# Patient Record
Sex: MALE | Race: WHITE | NOT HISPANIC OR LATINO | URBAN - METROPOLITAN AREA
[De-identification: names, ages, dates, MRNs, and addresses within clinical notes are randomized per-mention and may not be internally consistent; named-entity substitution may affect disease eponyms.]

---

## 2019-12-15 ENCOUNTER — EMERGENCY (EMERGENCY)
Facility: HOSPITAL | Age: 24
LOS: 1 days | Discharge: HOME | End: 2019-12-15
Attending: EMERGENCY MEDICINE
Payer: COMMERCIAL

## 2019-12-15 VITALS
SYSTOLIC BLOOD PRESSURE: 110 MMHG | TEMPERATURE: 98 F | OXYGEN SATURATION: 96 % | RESPIRATION RATE: 16 BRPM | HEART RATE: 64 BPM

## 2019-12-15 VITALS
WEIGHT: 154.1 LBS | HEART RATE: 76 BPM | RESPIRATION RATE: 18 BRPM | HEIGHT: 67 IN | OXYGEN SATURATION: 100 % | SYSTOLIC BLOOD PRESSURE: 142 MMHG | TEMPERATURE: 98 F | DIASTOLIC BLOOD PRESSURE: 91 MMHG

## 2019-12-15 PROCEDURE — 12001 RPR S/N/AX/GEN/TRNK 2.5CM/<: CPT | Mod: XU

## 2019-12-15 PROCEDURE — 99283 EMERGENCY DEPT VISIT LOW MDM: CPT | Mod: 25

## 2019-12-15 PROCEDURE — 12001 RPR S/N/AX/GEN/TRNK 2.5CM/<: CPT | Mod: 59

## 2019-12-15 PROCEDURE — 73130 X-RAY EXAM OF HAND: CPT

## 2019-12-15 PROCEDURE — 29125 APPL SHORT ARM SPLINT STATIC: CPT

## 2019-12-15 PROCEDURE — 29125 APPL SHORT ARM SPLINT STATIC: CPT | Mod: RT

## 2019-12-15 PROCEDURE — 73130 X-RAY EXAM OF HAND: CPT | Mod: 26,RT

## 2019-12-15 NOTE — ED PROVIDER NOTE - ATTENDING CONTRIBUTION TO CARE
------------ATTENDING NOTE------------   healthy vaccinated RHD pt w/ parents c/o mild dull pain and laceration to R hand (over 5th MCP) from punching glass tonight, has FROM, 5/5, nvi w/ bcr distally (2 pt discrimination), no FB, sutured/splinted, nml VS at dc, no additional complaints, pt/family feel safe at home, in depth dw all about ddx, tx, albarran, continued close outpt fu.  - Errol Potter MD  ---------------------------------------------- ------------ATTENDING NOTE------------   healthy vaccinated RHD pt w/ parents c/o mild dull pain and laceration to R hand (over 5th MCP) from punching glass tonight, has FROM, 5/5, nvi w/ bcr distally (2 pt discrimination), no FB on extensive exam (pt/family still understand risk of potential retained FB), sutured/splinted, nml VS at [place], no additional complaints, pt/family feel safe at home, in depth dw all about ddx, tx, albarran, continued close outpt fu.  - Errol Potter MD  ----------------------------------------------

## 2019-12-15 NOTE — ED PROVIDER NOTE - NSFOLLOWUPINSTRUCTIONS_ED_ALL_ED_FT
1. You were seen in the Emergency Room for lacerations  2. You may take ACETAMINOPHEN and IBUPROFEN as directed on packaging. Always follow medication instructions and read medication side effects. Stop using these medications if you have any concerns   3. Please follow up with your doctor in 7-10 days to have sutures removed  4. Return to the emergency room or seek immediate assistance for any new or concerning symptoms (such as fevers, chills, numbness/tingling in your fingers, color changes or worsening pain in your fingers, redness, swelling or discharge from your wounds ), or if you get worse.   5. Copies of your tests were provided to you for follow-up.  You must address all your findings with your doctor.     -Pain should decrease after 24-48 hours. Please see your physician immediately if pain increases or recurs because this may be a sign of infection  -If possible, keep wound elevated  -Most wounds can be gently cleansed 12-24 hours after wound repair. You can bath after 12-24h as long as the wound is not immersed or soaked in water. Application of an antibiotic ointment or reapplication of a dressing is recommended after each washing.  -You should watch for signs of wound infection such as excessive discomfort, continuous drainage (especially purulent), wound redness and swelling, induration or tenderness, lymph node enlargement and fever. Please return to the Emergency room or seek immediate assistance should these symptoms occur.

## 2019-12-15 NOTE — ED PROVIDER NOTE - CARE PLAN
Principal Discharge DX:	Laceration of right hand without foreign body, initial encounter  Secondary Diagnosis:	Contusion of right hand, initial encounter

## 2019-12-15 NOTE — ED PROVIDER NOTE - PROGRESS NOTE DETAILS
Doris Avila MD PGY-2 lacs repaired and patient placed in volar splint to restrict flexion of mcps and strain on sutures. see note. d/c'd home with pmd follow up and return precautions

## 2019-12-15 NOTE — ED PROCEDURE NOTE - NS ED PROCEDURE ASSISTED BY
Supervision was available
Assistance was available
Supervision was available
Supervision was available

## 2019-12-15 NOTE — ED PROCEDURE NOTE - CPROC ED POST PROC CARE GUIDE1
Verbal/written post procedure instructions were given to patient/caregiver./Instructed patient/caregiver regarding signs and symptoms of infection./Instructed patient/caregiver to follow-up with primary care physician./Keep the cast/splint/dressing clean and dry.
Instructed patient/caregiver regarding signs and symptoms of infection./Keep the cast/splint/dressing clean and dry./Verbal/written post procedure instructions were given to patient/caregiver./Instructed patient/caregiver to follow-up with primary care physician.
Instructed patient/caregiver to follow-up with primary care physician./Keep the cast/splint/dressing clean and dry./Verbal/written post procedure instructions were given to patient/caregiver./Instructed patient/caregiver regarding signs and symptoms of infection.

## 2019-12-15 NOTE — ED PROVIDER NOTE - OBJECTIVE STATEMENT
Doris Avila MD PGY-2 23 yo M p/w R hand lacerations after punching glass. Tetanus up to date. Doris Avila MD PGY-2 23 yo M no pmh p/w R hand lacerations after punching glass. Tetanus up to date.

## 2019-12-15 NOTE — ED ADULT NURSE NOTE - OBJECTIVE STATEMENT
23y/o M coming to the ED with 3 lacerations on the top of his hand. Pt states that 30minutes ago he punched glass causing lacerations on the top of his hand. Pt c/o of 3/10 pain and does not want anything for pain. Pt has positive peripheral pulses. Pt c/o of numbness and tingling but has positive sensation intact. Pt having slight active bleeding from lacerations. 23y/o M coming to the ED with 3 lacerations on the top of his hand by the carpal bones. Pt states that 30minutes ago he punched glass causing lacerations on the top of his hand. Pt c/o of 3/10 pain and does not want anything for pain. Pt has positive peripheral pulses. Pt c/o of numbness and tingling but has positive sensation intact. Pt having minimal bleeding from lacerations.

## 2019-12-15 NOTE — ED PROVIDER NOTE - PATIENT PORTAL LINK FT
You can access the FollowMyHealth Patient Portal offered by [facility] by registering at the following website: [email or web address]. By joining [employer]’s FollowMyHealth portal, you will also be able to view your health information using other applications (apps) compatible with our system.

## 2019-12-15 NOTE — ED PROCEDURE NOTE - ATTENDING CONTRIBUTION TO CARE
as above -- Errol Potter MD
BX dressing to wounds, short volar splint for R hand, nvi w/ bcr distally post application -- Errol Potter MD
as above  - Errol Potter MD
as above, un complicated  -- Errol Potter MD

## 2019-12-15 NOTE — ED PROCEDURE NOTE - CPROC ED TIME OUT STATEMENT1
“Patient's name, , procedure and correct site were confirmed during the [place] Timeout.”
“Patient's name, , procedure and correct site were confirmed during the [place] Timeout.”
“Patient's name, , procedure and correct site were confirmed during the [place] Timeout.”
“Patient's name, , procedure and correct site were confirmed during the [place] Timeout.”

## 2019-12-15 NOTE — ED PROVIDER NOTE - PHYSICAL EXAMINATION
PHYSICAL EXAM:   General: well-appearing, appears stated age, in no acute distress  HEENT: NC/AT,  airway patent  Cardiovascular: regular rate   Respiratory: , nonlabored respirations  Extremities: R hand- skin avulsion at 5th MCP and additional laceration over 3rd MCP. No tenderness to palpation along 5th metacarpal. full  strength. Sensation difficult to assess because patient has already received ulnar block at the time of my exam. +R radial pulse  Skin: as above  -Doris Avila PGY-2

## 2020-02-10 PROBLEM — Z78.9 OTHER SPECIFIED HEALTH STATUS: Chronic | Status: ACTIVE | Noted: 2019-12-17

## 2020-02-20 ENCOUNTER — APPOINTMENT (OUTPATIENT)
Dept: ORTHOPEDIC SURGERY | Facility: CLINIC | Age: 25
End: 2020-02-20
Payer: COMMERCIAL

## 2020-02-20 VITALS
DIASTOLIC BLOOD PRESSURE: 70 MMHG | SYSTOLIC BLOOD PRESSURE: 142 MMHG | HEART RATE: 68 BPM | BODY MASS INDEX: 24.17 KG/M2 | WEIGHT: 154 LBS | HEIGHT: 67 IN

## 2020-02-20 DIAGNOSIS — Z78.9 OTHER SPECIFIED HEALTH STATUS: ICD-10-CM

## 2020-02-20 DIAGNOSIS — S61.411D LACERATION W/OUT FOREIGN BODY OF RIGHT HAND, SUBSEQUENT ENCOUNTER: ICD-10-CM

## 2020-02-20 PROCEDURE — 99204 OFFICE O/P NEW MOD 45 MIN: CPT

## 2020-02-20 NOTE — PHYSICAL EXAM
[de-identified] : Previous imaging reviewed showing possible cortical violation of 5th metacarpal  [de-identified] : Constitutional: Alert and in no acute distress.\par Psychiatric: Oriented to person, place, and time. Insight and judgement were intact and the affect was normal.\par Cardiovascular: Regular rate assessed through peripheral pulses.\par Pulmonary: Nonlabored breathing on room air.\par Lymphatics: No peripheral lymphadenopathy appreciated.\par \par Musculoskeletal:\par \par Cervical spine mobility is full in all directions. \par \par No skin changes are noted to the upper extremities. Muscle bulk and contour are within normal limits without evidence of atrophy. Healed laceration over dorsal metacarpal phalangeal joints. Soft tissue prominence over 4th extensor tendon on dorsum of hand.\par \par Mobility is full about the elbows with flexion and extension. Forearm supination measures 85/85 degrees. Forearm pronation measures 85/85 degrees. Wrist flexion measured 75/75 degrees. Wrist extension measures 65/65 degrees. Digital flexion and extension is full. No extensor tendon weakness on right. Thumb opposition is full to the base of the small fingers bilaterally. Thumb abduction measures 5/5 in strength. Interosseous abduction measures 5/5 in strength. No cords or nodules are palpated. No triggering is observed. No tenderness over the thumb CMC articulations is observed. Scaphoid shift test is negative. Finkelstein test is negative. Scapholunate and lunotriquetral ballottement test are negative. Ulnar snuffbox tenderness is negative. Ulnar impingement test is negative. DRUJ piano key test is negative.\par \par Sensation is intact to light touch in the ulnar, median, and radial nerve distributions. Carpal tunnel provocative maneuvers are negative. Cubital tunnel provocative maneuvers are negative. Fingers are pink and well perfused. Capillary refill is brisk.\par

## 2020-02-20 NOTE — ASSESSMENT
[FreeTextEntry1] : 24 year old male presents with right dorsal hand laceration with resulting stiffness and no extensor weakness. Treatment options were discussed, including operative and nonoperative treatment. At this time, I have recommended and the patient has opted for nonoperative treatment. Recommend therapy to work on scar massage. Return appointment will be scheduled for four weeks.

## 2020-02-20 NOTE — HISTORY OF PRESENT ILLNESS
[FreeTextEntry1] : Trung is a 24y male who presents with right hand pain x3 months. In 12/2019, he cut the dorsum of his hand on glass. He went to [facility] where he had x-rays taken and had his hand sutured. Since then, he has had a bump on the dorsum of his hand with pain. Pain is 6/10, worse when making a fist, and  radiates to the tips of his middle and ring fingers. He has not done anything to treat it. The bump has increased in size since he first noticed it in December. He also has occasional numbness in the 2-5th fingers, but is reporting no numbness today.

## 2020-03-20 ENCOUNTER — APPOINTMENT (OUTPATIENT)
Dept: ORTHOPEDIC SURGERY | Facility: CLINIC | Age: 25
End: 2020-03-20

## 2023-05-19 ENCOUNTER — APPOINTMENT (OUTPATIENT)
Dept: ORTHOPEDIC SURGERY | Facility: CLINIC | Age: 28
End: 2023-05-19
Payer: COMMERCIAL

## 2023-05-19 VITALS
HEART RATE: 66 BPM | BODY MASS INDEX: 24.33 KG/M2 | HEIGHT: 67 IN | TEMPERATURE: 98 F | WEIGHT: 155 LBS | OXYGEN SATURATION: 99 %

## 2023-05-19 DIAGNOSIS — M25.531 PAIN IN RIGHT WRIST: ICD-10-CM

## 2023-05-19 PROCEDURE — 99203 OFFICE O/P NEW LOW 30 MIN: CPT

## 2023-05-19 PROCEDURE — 73110 X-RAY EXAM OF WRIST: CPT | Mod: RT

## 2023-05-19 RX ORDER — DICLOFENAC SODIUM 75 MG/1
75 TABLET, DELAYED RELEASE ORAL
Qty: 1 | Refills: 0 | Status: ACTIVE | COMMUNITY
Start: 2023-05-19 | End: 1900-01-01

## 2023-05-19 NOTE — DISCUSSION/SUMMARY
[de-identified] : The patient has right wrist ulnar-sided pain.  This is likely a sprain.  He may have a TFCC injury.  I have discussed the pathology, natural history and treatment options with him.  He is started on a course of diclofenac.  Medication risks have been reviewed.  He will be reevaluated in 3 weeks.

## 2023-05-19 NOTE — HISTORY OF PRESENT ILLNESS
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 He reports he injured the wrist throwing a jab on a boxing bag. He feels intermittent sharp pain worse with rotating the wrist and ulnar deviation. He also has some pain with flicking movements of the wrist while lifting weights. He has tried Tylenol and icing with some relief. He has some reported weakness in the right hand. Denies paresthesias. Denies prior injury.

## 2023-05-19 NOTE — PHYSICAL EXAM
[Rad] : radial 2+ and symmetric bilaterally [Normal] : Alert and in no acute distress [Poor Appearance] : well-appearing [Acute Distress] : not in acute distress [Obese] : not obese [de-identified] : The patient has no respiratory distress. Mood and affect are normal. The patient is alert and oriented to person, place and time.\par Examination of the cervical spine demonstrates no tenderness, no deformity and no muscle spasm. Cervical spine rotation is 60° to the right, 60° to the left, 75° of extension and 45° of flexion. Neurologic exam of the upper extremities reveals intact sensation to light touch. Motor function is 5 over 5 in all groups. Deep tendon reflexes are 2+ and equal at the biceps, triceps and brachioradialis.\par Examination of the right shoulder demonstrates no swelling, no deformity and no tenderness. The shoulder is stable. Drop arm test is negative. Kossuth test is negative. Liftoff test is negative. Motor strength is 5 over 5 in all groups. Range of motion is full and identical to that of the left shoulder. Flexion is 160°, abduction 160°, external rotation 45° and internal rotation to the lower thoracic level.  There is no pain with motion of the elbows.  There is no tenderness of either elbow.  Examination of the right wrist demonstrates ulnar-sided tenderness.  There is no radial tenderness.  There is ulnar pain with wrist motion and with forearm rotation.  Tendon function is intact.  There is no instability.  Capillary refill is brisk. [de-identified] : AP, lateral and oblique x-rays of the right wrist taken today demonstrate no fracture, no dislocation and no bony abnormality.

## 2023-09-14 ENCOUNTER — APPOINTMENT (OUTPATIENT)
Dept: ORTHOPEDIC SURGERY | Facility: CLINIC | Age: 28
End: 2023-09-14
Payer: COMMERCIAL

## 2023-09-14 VITALS
BODY MASS INDEX: 24.64 KG/M2 | SYSTOLIC BLOOD PRESSURE: 113 MMHG | HEIGHT: 67 IN | TEMPERATURE: 97.4 F | DIASTOLIC BLOOD PRESSURE: 76 MMHG | OXYGEN SATURATION: 98 % | WEIGHT: 157 LBS

## 2023-09-14 DIAGNOSIS — M25.562 PAIN IN LEFT KNEE: ICD-10-CM

## 2023-09-14 PROCEDURE — 99214 OFFICE O/P EST MOD 30 MIN: CPT

## 2023-09-14 PROCEDURE — 73564 X-RAY EXAM KNEE 4 OR MORE: CPT | Mod: LT

## 2023-09-20 ENCOUNTER — OUTPATIENT (OUTPATIENT)
Dept: OUTPATIENT SERVICES | Facility: HOSPITAL | Age: 28
LOS: 1 days | End: 2023-09-20
Payer: COMMERCIAL

## 2023-09-20 ENCOUNTER — APPOINTMENT (OUTPATIENT)
Dept: MRI IMAGING | Facility: CLINIC | Age: 28
End: 2023-09-20
Payer: COMMERCIAL

## 2023-09-20 DIAGNOSIS — S89.92XA UNSPECIFIED INJURY OF LEFT LOWER LEG, INITIAL ENCOUNTER: ICD-10-CM

## 2023-09-20 PROCEDURE — 73721 MRI JNT OF LWR EXTRE W/O DYE: CPT

## 2023-09-20 PROCEDURE — 73721 MRI JNT OF LWR EXTRE W/O DYE: CPT | Mod: 26,LT

## 2023-09-22 ENCOUNTER — APPOINTMENT (OUTPATIENT)
Dept: ORTHOPEDIC SURGERY | Facility: CLINIC | Age: 28
End: 2023-09-22
Payer: COMMERCIAL

## 2023-09-22 VITALS
SYSTOLIC BLOOD PRESSURE: 114 MMHG | TEMPERATURE: 97.8 F | DIASTOLIC BLOOD PRESSURE: 71 MMHG | WEIGHT: 157 LBS | HEART RATE: 59 BPM | BODY MASS INDEX: 24.64 KG/M2 | HEIGHT: 67 IN

## 2023-09-22 DIAGNOSIS — S89.92XA UNSPECIFIED INJURY OF LEFT LOWER LEG, INITIAL ENCOUNTER: ICD-10-CM

## 2023-09-22 PROCEDURE — 99214 OFFICE O/P EST MOD 30 MIN: CPT

## 2023-10-02 ENCOUNTER — APPOINTMENT (OUTPATIENT)
Dept: ORTHOPEDIC SURGERY | Facility: CLINIC | Age: 28
End: 2023-10-02
Payer: COMMERCIAL

## 2023-10-02 VITALS
HEIGHT: 67 IN | DIASTOLIC BLOOD PRESSURE: 81 MMHG | SYSTOLIC BLOOD PRESSURE: 119 MMHG | WEIGHT: 155 LBS | BODY MASS INDEX: 24.33 KG/M2 | HEART RATE: 60 BPM

## 2023-10-02 PROCEDURE — 99214 OFFICE O/P EST MOD 30 MIN: CPT

## 2023-11-06 ENCOUNTER — APPOINTMENT (OUTPATIENT)
Dept: ORTHOPEDIC SURGERY | Facility: CLINIC | Age: 28
End: 2023-11-06
Payer: COMMERCIAL

## 2023-11-06 PROCEDURE — 99213 OFFICE O/P EST LOW 20 MIN: CPT

## 2023-12-18 ENCOUNTER — APPOINTMENT (OUTPATIENT)
Dept: ORTHOPEDIC SURGERY | Facility: CLINIC | Age: 28
End: 2023-12-18
Payer: COMMERCIAL

## 2023-12-18 VITALS
SYSTOLIC BLOOD PRESSURE: 134 MMHG | BODY MASS INDEX: 24.8 KG/M2 | WEIGHT: 158 LBS | DIASTOLIC BLOOD PRESSURE: 77 MMHG | HEIGHT: 67 IN

## 2023-12-18 DIAGNOSIS — S83.522D SPRAIN OF POSTERIOR CRUCIATE LIGAMENT OF LEFT KNEE, SUBSEQUENT ENCOUNTER: ICD-10-CM

## 2023-12-18 PROCEDURE — 99213 OFFICE O/P EST LOW 20 MIN: CPT

## 2023-12-19 PROBLEM — S83.522D RUPTURE OF POSTERIOR CRUCIATE LIGAMENT OF LEFT KNEE, SUBSEQUENT ENCOUNTER: Status: ACTIVE | Noted: 2023-10-02

## 2023-12-19 NOTE — PHYSICAL EXAM
[de-identified] : Left Knee Exam:   Skin: Clean, dry, intact Inspection: No obvious malalignment, no masses, no swelling, mild effusion Pulses: 2+ DP/PT pulses ROM: 0-135 degrees of flexion. Min pain with deep knee flexion/extension. Tenderness: No MJLT. No LJLT. No pain over the patella facets. No pain to the quadriceps tendon. No pain to the patella tendon. No posterior knee tenderness. Stability: Stable to varus, valgus. Negative Lachman testing. Negative anterior drawer, firm end point with posterior drawer. Increased AP instability c/w contralateral side. Strength: 5/5 Q/H/TA/GS/EHL, without atrophy Neuro: Intact to light touch throughout, DTRs normal Additional Tests: Negative Anjum's test, Negative patellar grind test Other findings: None. [de-identified] : Images were reviewed dated 9/14/2023.  4 views of the left knee that show no acute fracture or dislocation. There is no medial, no lateral and no patellofemoral degenerative changes seen. There is no significant malalignment. No significant other obvious osseous abnormality, otherwise unremarkable.   MRI left knee dated 09/14/2023 shows full-thickness midsubstance rupture of the posterior cruciate ligament. Large joint effusion.  We independently reviewed and discussed in detail the images and the radiologic reports with the patient.

## 2023-12-19 NOTE — HISTORY OF PRESENT ILLNESS
[de-identified] : 28 year old male presents for f/u of left knee injury that occurred 9/13/ 23. He was playing basketball, jumped and landed, hitting his knee into the wall. He has been attending PT 2x per week. Reports improvement of strength in the knee. Takes Advil as needed.

## 2023-12-19 NOTE — DISCUSSION/SUMMARY
[de-identified] : 27 y/o male with left PCL tear.   Patient presents for follow-up of PCL tear.  Patient continues to have a firm endpoint on clinical examination, but with increased AP instability compared to the contralateral side.  Patient continues to wear his brace, and has been progressing with physical therapy and home exercise program.  Patient's symptoms are improving but has been feeling some increased sharp discomforts inside the knee joint. We discussed continued conservative care.  Recommendations: Continue PT, Rx given; increase impact activity to tolerance. Functional PCL bracing.  Ice/NSAIDs as needed  Follow-up 2-3 months.

## 2023-12-19 NOTE — ADDENDUM
[FreeTextEntry1] : This note was written by Alejandra Valdovinos on 12/18/2023 acting solely as a scribe for Dr. Ricardo Cooper.  All medical record entries made by the Scribe were at my, Dr. Ricardo Cooper, direction and personally dictated by me on 12/18/2023. I have personally reviewed the chart and agree that the record accurately reflects my personal performance of the history, physical exam, assessment and plan.

## 2024-02-12 ENCOUNTER — APPOINTMENT (OUTPATIENT)
Dept: ORTHOPEDIC SURGERY | Facility: CLINIC | Age: 29
End: 2024-02-12

## 2024-09-09 ENCOUNTER — EMERGENCY (EMERGENCY)
Facility: HOSPITAL | Age: 29
LOS: 1 days | Discharge: HOME | End: 2024-09-09
Attending: EMERGENCY MEDICINE
Payer: COMMERCIAL

## 2024-09-09 VITALS
HEART RATE: 70 BPM | RESPIRATION RATE: 18 BRPM | HEIGHT: 68 IN | OXYGEN SATURATION: 100 % | DIASTOLIC BLOOD PRESSURE: 68 MMHG | WEIGHT: 160.06 LBS | TEMPERATURE: 98 F | SYSTOLIC BLOOD PRESSURE: 116 MMHG

## 2024-09-09 VITALS
HEART RATE: 63 BPM | DIASTOLIC BLOOD PRESSURE: 59 MMHG | TEMPERATURE: 98 F | RESPIRATION RATE: 16 BRPM | OXYGEN SATURATION: 100 % | SYSTOLIC BLOOD PRESSURE: 111 MMHG

## 2024-09-09 LAB
ACANTHOCYTES BLD QL SMEAR: SLIGHT — SIGNIFICANT CHANGE UP
ALBUMIN SERPL ELPH-MCNC: 4.6 G/DL — SIGNIFICANT CHANGE UP (ref 3.3–5)
ALP SERPL-CCNC: 68 U/L — SIGNIFICANT CHANGE UP (ref 40–120)
ALT FLD-CCNC: 27 U/L — SIGNIFICANT CHANGE UP (ref 10–45)
ANION GAP SERPL CALC-SCNC: 12 MMOL/L — SIGNIFICANT CHANGE UP (ref 5–17)
ANISOCYTOSIS BLD QL: SLIGHT — SIGNIFICANT CHANGE UP
APPEARANCE UR: CLEAR — SIGNIFICANT CHANGE UP
AST SERPL-CCNC: 21 U/L — SIGNIFICANT CHANGE UP (ref 10–40)
BASOPHILS # BLD AUTO: 0 K/UL — SIGNIFICANT CHANGE UP (ref 0–0.2)
BASOPHILS NFR BLD AUTO: 0 % — SIGNIFICANT CHANGE UP (ref 0–2)
BILIRUB SERPL-MCNC: 1.1 MG/DL — SIGNIFICANT CHANGE UP (ref 0.2–1.2)
BILIRUB UR-MCNC: NEGATIVE — SIGNIFICANT CHANGE UP
BUN SERPL-MCNC: 16 MG/DL — SIGNIFICANT CHANGE UP (ref 7–23)
CALCIUM SERPL-MCNC: 9.5 MG/DL — SIGNIFICANT CHANGE UP (ref 8.4–10.5)
CHLORIDE SERPL-SCNC: 104 MMOL/L — SIGNIFICANT CHANGE UP (ref 96–108)
CO2 SERPL-SCNC: 24 MMOL/L — SIGNIFICANT CHANGE UP (ref 22–31)
COLOR SPEC: YELLOW — SIGNIFICANT CHANGE UP
CREAT SERPL-MCNC: 0.98 MG/DL — SIGNIFICANT CHANGE UP (ref 0.5–1.3)
DACRYOCYTES BLD QL SMEAR: SLIGHT — SIGNIFICANT CHANGE UP
DIFF PNL FLD: NEGATIVE — SIGNIFICANT CHANGE UP
EGFR: 107 ML/MIN/1.73M2 — SIGNIFICANT CHANGE UP
ELLIPTOCYTES BLD QL SMEAR: SLIGHT — SIGNIFICANT CHANGE UP
EOSINOPHIL # BLD AUTO: 0.11 K/UL — SIGNIFICANT CHANGE UP (ref 0–0.5)
EOSINOPHIL NFR BLD AUTO: 1.8 % — SIGNIFICANT CHANGE UP (ref 0–6)
GLUCOSE SERPL-MCNC: 87 MG/DL — SIGNIFICANT CHANGE UP (ref 70–99)
GLUCOSE UR QL: NEGATIVE MG/DL — SIGNIFICANT CHANGE UP
HCT VFR BLD CALC: 36 % — LOW (ref 39–50)
HGB BLD-MCNC: 11.2 G/DL — LOW (ref 13–17)
KETONES UR-MCNC: NEGATIVE MG/DL — SIGNIFICANT CHANGE UP
LEUKOCYTE ESTERASE UR-ACNC: NEGATIVE — SIGNIFICANT CHANGE UP
LIDOCAIN IGE QN: 26 U/L — SIGNIFICANT CHANGE UP (ref 7–60)
LYMPHOCYTES # BLD AUTO: 1.39 K/UL — SIGNIFICANT CHANGE UP (ref 1–3.3)
LYMPHOCYTES # BLD AUTO: 22.1 % — SIGNIFICANT CHANGE UP (ref 13–44)
MACROCYTES BLD QL: SLIGHT — SIGNIFICANT CHANGE UP
MANUAL SMEAR VERIFICATION: SIGNIFICANT CHANGE UP
MCHC RBC-ENTMCNC: 20.2 PG — LOW (ref 27–34)
MCHC RBC-ENTMCNC: 31.1 GM/DL — LOW (ref 32–36)
MCV RBC AUTO: 65 FL — LOW (ref 80–100)
MICROCYTES BLD QL: SLIGHT — SIGNIFICANT CHANGE UP
MONOCYTES # BLD AUTO: 0.67 K/UL — SIGNIFICANT CHANGE UP (ref 0–0.9)
MONOCYTES NFR BLD AUTO: 10.6 % — SIGNIFICANT CHANGE UP (ref 2–14)
NEUTROPHILS # BLD AUTO: 4.13 K/UL — SIGNIFICANT CHANGE UP (ref 1.8–7.4)
NEUTROPHILS NFR BLD AUTO: 65.5 % — SIGNIFICANT CHANGE UP (ref 43–77)
NITRITE UR-MCNC: NEGATIVE — SIGNIFICANT CHANGE UP
OVALOCYTES BLD QL SMEAR: SLIGHT — SIGNIFICANT CHANGE UP
PH UR: 6.5 — SIGNIFICANT CHANGE UP (ref 5–8)
PLAT MORPH BLD: ABNORMAL
PLATELET # BLD AUTO: 164 K/UL — SIGNIFICANT CHANGE UP (ref 150–400)
POIKILOCYTOSIS BLD QL AUTO: SIGNIFICANT CHANGE UP
POLYCHROMASIA BLD QL SMEAR: SLIGHT — SIGNIFICANT CHANGE UP
POTASSIUM SERPL-MCNC: 4.1 MMOL/L — SIGNIFICANT CHANGE UP (ref 3.5–5.3)
POTASSIUM SERPL-SCNC: 4.1 MMOL/L — SIGNIFICANT CHANGE UP (ref 3.5–5.3)
PROT SERPL-MCNC: 7.5 G/DL — SIGNIFICANT CHANGE UP (ref 6–8.3)
PROT UR-MCNC: NEGATIVE MG/DL — SIGNIFICANT CHANGE UP
RBC # BLD: 5.54 M/UL — SIGNIFICANT CHANGE UP (ref 4.2–5.8)
RBC # FLD: 16 % — HIGH (ref 10.3–14.5)
RBC BLD AUTO: ABNORMAL
SODIUM SERPL-SCNC: 140 MMOL/L — SIGNIFICANT CHANGE UP (ref 135–145)
SP GR SPEC: >1.03 — HIGH (ref 1–1.03)
UROBILINOGEN FLD QL: 1 MG/DL — SIGNIFICANT CHANGE UP (ref 0.2–1)
WBC # BLD: 6.3 K/UL — SIGNIFICANT CHANGE UP (ref 3.8–10.5)
WBC # FLD AUTO: 6.3 K/UL — SIGNIFICANT CHANGE UP (ref 3.8–10.5)

## 2024-09-09 PROCEDURE — 83690 ASSAY OF LIPASE: CPT

## 2024-09-09 PROCEDURE — 74177 CT ABD & PELVIS W/CONTRAST: CPT | Mod: MC

## 2024-09-09 PROCEDURE — 80053 COMPREHEN METABOLIC PANEL: CPT

## 2024-09-09 PROCEDURE — 99284 EMERGENCY DEPT VISIT MOD MDM: CPT | Mod: 25

## 2024-09-09 PROCEDURE — 81003 URINALYSIS AUTO W/O SCOPE: CPT

## 2024-09-09 PROCEDURE — 96374 THER/PROPH/DIAG INJ IV PUSH: CPT | Mod: XU

## 2024-09-09 PROCEDURE — 96375 TX/PRO/DX INJ NEW DRUG ADDON: CPT

## 2024-09-09 PROCEDURE — 99285 EMERGENCY DEPT VISIT HI MDM: CPT

## 2024-09-09 PROCEDURE — 74177 CT ABD & PELVIS W/CONTRAST: CPT | Mod: 26,MC

## 2024-09-09 PROCEDURE — 85025 COMPLETE CBC W/AUTO DIFF WBC: CPT

## 2024-09-09 RX ORDER — ACETAMINOPHEN 325 MG/1
1000 TABLET ORAL ONCE
Refills: 0 | Status: COMPLETED | OUTPATIENT
Start: 2024-09-09 | End: 2024-09-09

## 2024-09-09 RX ORDER — ONDANSETRON 2 MG/ML
4 INJECTION, SOLUTION INTRAMUSCULAR; INTRAVENOUS ONCE
Refills: 0 | Status: COMPLETED | OUTPATIENT
Start: 2024-09-09 | End: 2024-09-09

## 2024-09-09 RX ADMIN — Medication 1000 MILLILITER(S): at 16:01

## 2024-09-09 RX ADMIN — ACETAMINOPHEN 400 MILLIGRAM(S): 325 TABLET ORAL at 16:01

## 2024-09-09 RX ADMIN — ONDANSETRON 4 MILLIGRAM(S): 2 INJECTION, SOLUTION INTRAMUSCULAR; INTRAVENOUS at 16:01

## 2024-09-09 NOTE — ED PROVIDER NOTE - PATIENT PORTAL LINK FT
You can access the FollowMyHealth Patient Portal offered by [facility] by registering at the following website: [email or web address]. By joining [employer]’s FollowMyHealth portal, you will also be able to view your health information using other applications (apps) compatible with our system.

## 2024-09-09 NOTE — ED ADULT NURSE NOTE - NSFALLUNIVINTERV_ED_ALL_ED
Bed/Stretcher in lowest position, wheels locked, appropriate side rails in place/Call bell, personal items and telephone in reach/Instruct patient to call for assistance before getting out of bed/chair/stretcher/Non-slip footwear applied when patient is off stretcher/[place] to call system/Physically safe environment - no spills, clutter or unnecessary equipment/Purposeful proactive rounding/Room/bathroom lighting operational, light cord in reach

## 2024-09-09 NOTE — ED PROVIDER NOTE - PROGRESS NOTE DETAILS
Jersey Preston DO (PGY-2)Received signout on patient from day team.  29-year-old male past medical history hypothyroidism presenting complaining of abdominal pain since last night.  Now localized to the right lower quadrant.  Positive nausea, no vomiting.  Normal bowel movements.  CT unable to identify appendix recommending serial abdominal exams.  Spoke with surgery resident at bedside stating give patient fluids and p.o. challenge.  Patient successfully p.o. challenged here and is declining fluids requesting to go home. Patient reevaluated at bedside. Patient is doing well, abd sntnd no rebound no guarding non rigidity.  I spoke with the patient extensively about return he endorsed understanding via teach back method.  He will follow-up with a GI doctor as scheduled.

## 2024-09-09 NOTE — ED PROVIDER NOTE - PHYSICAL EXAMINATION
Physical Exam:  General: NAD, Conversive  Eyes: EOMI, Conjunctiva and sclera clear  Neck: No JVD  Lungs: Clear to auscultation bilaterally, no wheeze, no rhonchi  Heart: Normal S1, S2, no murmurs  Abdomen: Soft, + tender to RLQ, nondistended, no CVA tenderness. Negative Rovsing's and Psoas sign  Extremities: 2+ peripheral pulses, no edema  Psych: AAO X3  Neurologic: Non-focal

## 2024-09-09 NOTE — ED PROVIDER NOTE - NSFOLLOWUPINSTRUCTIONS_ED_ALL_ED_FT
You were seen in the emergency department for abdominal pain. We have evaluated you and determined that you do not require further hospital interventions.    During your stay you had the following relevant results:   You had a CAT scan which was not able to well visualize the appendix.  Your blood work does not have signs of infection.  Your urine does not show signs of infection.  You received fluids and you are able to tolerate oral foods and you note that your pain has improved significantly.  It is extremely important that you follow-up with a GI doctor.  Should your symptoms worsen–worsening abdominal pain, nausea, vomiting, fever, chills please return to the emergency department.  Please follow up with your primary care provider as necessary to discuss the results of your stay in our department.

## 2024-09-09 NOTE — ED PROVIDER NOTE - OBJECTIVE STATEMENT
This is a 29-year-old male with history of hypothyroidism presenting for abdominal pain.  He reports last night he developed onset of generalized abdominal pain and then this morning it has localized to the right lower quadrant.  Associated with nausea but no vomiting.  Last bowel movement was yesterday and normal.  He did have some chills in the waiting room but otherwise no fevers.  No dysuria, testicular pain.  No history of surgeries.  Did not take any medication for symptoms.

## 2024-09-09 NOTE — ED ADULT NURSE NOTE - OBJECTIVE STATEMENT
29 y.argelia RODRIGUEZ [facility] IBS, 29 y.o M PMH IBS, Hashimotos, on synthroid presenting to the ED for RLQ abd pain. Pt reports that he first noticed the pain last night across his lower abdomen but the pain persisted into the AM and is now more localized to the RLQ. Pt reports nausea with decreased PO intake since last night. Denies vomiting, diarrhea, constipation. Last BM was yesterday and was normal as per patient. Pt denies fevers/chills, dizziness, weakness, urinary symptoms, headache, visual changes. Patient safety maintained, bed is in lowest position, wheels locked, and side rails raised. Patient oriented to call bell, and call bell is within reach.

## 2024-09-09 NOTE — ED PROVIDER NOTE - CLINICAL SUMMARY MEDICAL DECISION MAKING FREE TEXT BOX
This is a 29-year-old male with history as above presenting for abdominal pain.  Vitals unremarkable, exam as above.  Highest concern for appendicitis given description of symptoms, right lower quadrant tenderness.  Other differentials possible include nephrolithiasis, pyelonephritis, colitis, diverticulitis, cholecystitis, but these are all less likely.  Will obtain labs, CT, UA and treat symptomatically.

## 2024-09-09 NOTE — ED PROVIDER NOTE - ATTENDING CONTRIBUTION TO CARE
29M hx of IBS on Bentyl, Thalassemia, hypothyroid here with c/o abd pain onset last night 8PM, constant since then, assoc w nausea, no emesis, no bm today, no fever, no urinary sx, no testicular pain. Last night pain was crampy and diffuse across lower abdomen but today localized to RLQ, worse w breathing, coughing, bumps in car. VS non actionable. PE well appearing non toxic, stoic but slightly uncomfortable, RRR, lungs CTA Bl, soft RLQ TTP w referred pain. R/o appendicitis, Less likely renal colic, no urinary sx. Colitis is a possibility given hx of. Has had colonoscopy in past and r/o for IBD. Will get labs, CTAP, pain control. Dispo pending results.

## 2024-09-10 NOTE — CONSULT NOTE ADULT - SUBJECTIVE AND OBJECTIVE BOX
HPI: 29M PMH hypothyroidism, IBS, p/w nausea RLQ pain since 8pm last night. Denies vomiting. Denies fevers, chills, diarrhea, constipation. Last BM yesterday, passing gas today. States that this episode started by feeling like his previous IBS flares, but lasted longer than usual. Pt denies migratory symptoms. Endorses a colonoscopy 1-2 years ago with no abnormal findings.     PAST MEDICAL & SURGICAL HISTORY:  No pertinent past medical history    MEDICATIONS  (STANDING):  lactated ringers Bolus 1000 milliLiter(s) IV Bolus once    MEDICATIONS  (PRN):    Allergies    cephalosporins (Other)  amoxicillin (Other)    Intolerances    SOCIAL HISTORY:    FAMILY HISTORY:    Physical Exam:  General: NAD, resting comfortably  HEENT: NC/AT  Pulmonary: normal resp effort, saturating well on RA.   Cardiovascular: VSS, NSR   Abdominal: soft, non-distended, RLQ mild tenderness.   Extremities: WWP, normal strength  Neuro: A/O x 3, CNs II-XII grossly intact, normal sensation, no focal deficits  Pulses: palpable distal pulses    Vital Signs Last 24 Hrs  T(C): 36.8 (09 Sep 2024 21:37), Max: 36.9 (09 Sep 2024 12:59)  T(F): 98.2 (09 Sep 2024 21:37), Max: 98.5 (09 Sep 2024 12:59)  HR: 63 (09 Sep 2024 21:37) (61 - 76)  BP: 111/59 (09 Sep 2024 21:37) (108/53 - 116/76)  BP(mean): 85 (09 Sep 2024 16:08) (85 - 85)  RR: 16 (09 Sep 2024 21:37) (15 - 18)  SpO2: 100% (09 Sep 2024 21:37) (99% - 100%)    Parameters below as of 09 Sep 2024 21:37  Patient On (Oxygen Delivery Method): room air    LABS:                        11.2   6.30  )-----------( 164      ( 09 Sep 2024 16:12 )             36.0     09-09    140  |  104  |  16  ----------------------------<  87  4.1   |  24  |  0.98    Ca    9.5      09 Sep 2024 16:12    TPro  7.5  /  Alb  4.6  /  TBili  1.1  /  DBili  x   /  AST  21  /  ALT  27  /  AlkPhos  68  09-09      Urinalysis Basic - ( 09 Sep 2024 19:51 )    Color: Yellow / Appearance: Clear / SG: >1.030 / pH: x  Gluc: x / Ketone: Negative mg/dL  / Bili: Negative / Urobili: 1.0 mg/dL   Blood: x / Protein: Negative mg/dL / Nitrite: Negative   Leuk Esterase: Negative / RBC: x / WBC x   Sq Epi: x / Non Sq Epi: x / Bacteria: x      CAPILLARY BLOOD GLUCOSE    LIVER FUNCTIONS - ( 09 Sep 2024 16:12 )  Alb: 4.6 g/dL / Pro: 7.5 g/dL / ALK PHOS: 68 U/L / ALT: 27 U/L / AST: 21 U/L / GGT: x             INTERPRETATION:  CLINICAL INFORMATION: Right lower quadrant pain    COMPARISON: None.    CONTRAST/COMPLICATIONS:  IV Contrast: Omnipaque 350  90 cc administered   10 cc discarded  Oral Contrast: NONE  Complications: None reported at time of study completion    PROCEDURE:  CT of the Abdomen and Pelvis was performed.  Sagittal and coronal reformats were performed.    FINDINGS:  LOWERCHEST: Within normal limits.    LIVER: Within normal limits.  BILE DUCTS: Normal caliber.  GALLBLADDER: Within normal limits.  SPLEEN: Within normal limits.  PANCREAS: Within normal limits.  ADRENALS: Within normal limits.  KIDNEYS/URETERS: Within normal limits.    BLADDER: Within normal limits.  REPRODUCTIVE ORGANS: Prostate calcifications.    BOWEL: No bowel obstruction. Appendix is not visualized. No evidence of   inflammation in the pericecal region.  PERITONEUM/RETROPERITONEUM: Within normallimits.  VESSELS: Within normal limits.  LYMPH NODES: No lymphadenopathy. A prominent subcentimeter short axis   nodes in the right lower quadrant mesentery.  ABDOMINAL WALL: Within normal limits.  BONES: Within normal limits.    IMPRESSION:    Appendix is not identified. No secondary signs of appendicitis are seen.   Short-term follow-up examinations can be obtained if there is high   clinical suspicion for appendicitis.    Splenomegaly.    Few prominent right lower quadrant mesenteric lymph nodes without size   criteria for lymphadenopathy.      --- End of Report ---

## 2024-09-10 NOTE — CONSULT NOTE ADULT - ASSESSMENT
29M PMH hypothyroidism, IBS, p/w nausea RLQ pain that he describes as similar to previous IBS episodes, but longer in duration. CT scan without evidence of appendicitis, but appendix not identified. RLQ mesenteric lymph node enlargement. WBC 6.3.     Recommendations:   - No acute surgical intervention  - Pt felt a bit better than on entering ED.   - Pt trialed PO challenge and was resuscitated with IV fluids and symptoms resolved.  - ED sent patient home with return precautions    Patient discussed with Dr. Silva  [phone]

## 2024-09-16 ENCOUNTER — APPOINTMENT (OUTPATIENT)
Dept: ORTHOPEDIC SURGERY | Facility: CLINIC | Age: 29
End: 2024-09-16
Payer: COMMERCIAL

## 2024-09-16 VITALS — HEIGHT: 68 IN | BODY MASS INDEX: 24.25 KG/M2 | WEIGHT: 160 LBS

## 2024-09-16 DIAGNOSIS — S83.522D SPRAIN OF POSTERIOR CRUCIATE LIGAMENT OF LEFT KNEE, SUBSEQUENT ENCOUNTER: ICD-10-CM

## 2024-09-16 PROCEDURE — 99213 OFFICE O/P EST LOW 20 MIN: CPT

## 2024-09-16 NOTE — ADDENDUM
[FreeTextEntry1] : This note was written by Dalila Campo on 09/16/2024 acting solely as a scribe for Dr. Ricardo Cooper.   All medical record entries made by the Scribe were at my, Dr. Ricardo Cooper, direction and personally dictated by me on 09/16/2024. I have personally reviewed the chart and agree that the record accurately reflects my personal performance of the history, physical exam, assessment and plan.

## 2024-09-16 NOTE — PHYSICAL EXAM
[de-identified] : Left Knee Exam:   Skin: Clean, dry, intact Inspection: No obvious malalignment, no masses, no swelling, no effusion Pulses: 2+ DP/PT pulses ROM: 0-135 degrees of flexion. Min pain with deep knee flexion/extension. Tenderness: No MJLT. No LJLT. No pain over the patella facets. No pain to the quadriceps tendon. No pain to the patella tendon. No posterior knee tenderness. Stability: Stable to varus, valgus. Negative Lachman testing. Negative anterior drawer, Grade 2 posterior drawer with firm end point/ Strength: 5/5 Q/H/TA/GS/EHL, without atrophy Neuro: Intact to light touch throughout, DTRs normal Additional Tests: Negative Anjum's test, Negative patellar grind test Other findings: None. [de-identified] : Images were reviewed dated 9/14/2023.  4 views of the left knee that show no acute fracture or dislocation. There is no medial, no lateral and no patellofemoral degenerative changes seen. There is no significant malalignment. No significant other obvious osseous abnormality, otherwise unremarkable.   MRI left knee dated 09/14/2023 shows full-thickness midsubstance rupture of the posterior cruciate ligament. Large joint effusion.  We independently reviewed and discussed in detail the images and the radiologic reports with the patient.

## 2024-09-16 NOTE — DISCUSSION/SUMMARY
[de-identified] : 30 y/o male with left PCL tear.   Patient presents for follow-up of PCL tear. Patient continues to have a firm endpoint on clinical examination; however, he complains of inability to fully return to activity due to pain.  He denies any esdras instability to the left knee at this time.  We discussed role of continued conservative options with possible injection therapies/additional PT, and also briefly discussed role of surgical reconstruction.  I explained that clinically, the PCL has healed, and surgical reconstruction would typically be reserved for instability not necessarily pain.  Patient is not looking to consider surgical intervention at this time regardless, and we discussed utilization of advanced imaging to help determine whether there is any further breakdown of the joint that would necessitate additional/alternative treatments.  Recommendations: MRI Rx given. Ice/NSAIDs as needed.  Activity to tolerance  Follow-up after MRI.

## 2024-09-16 NOTE — HISTORY OF PRESENT ILLNESS
[de-identified] : 29-year-old male presents for follow-up regarding a left knee PCL injury sustained on September 13, 2023. Persistent pain exacerbated by physical exertion.  He reports ongoing frustration due to an inability to run and seeks to discuss treatment options. He reports that when he does try to run his knee eventually gives out on him like a "flat tire." The patient manages pain with intermittent Advil.  Patient also reports utilizing brace.

## 2024-12-09 ENCOUNTER — APPOINTMENT (OUTPATIENT)
Dept: ORTHOPEDIC SURGERY | Facility: CLINIC | Age: 29
End: 2024-12-09
Payer: COMMERCIAL

## 2024-12-09 VITALS
DIASTOLIC BLOOD PRESSURE: 60 MMHG | HEART RATE: 64 BPM | HEIGHT: 68 IN | WEIGHT: 158 LBS | BODY MASS INDEX: 23.95 KG/M2 | SYSTOLIC BLOOD PRESSURE: 120 MMHG

## 2024-12-09 DIAGNOSIS — M25.532 PAIN IN LEFT WRIST: ICD-10-CM

## 2024-12-09 DIAGNOSIS — S69.92XA UNSPECIFIED INJURY OF LEFT WRIST, HAND AND FINGER(S), INITIAL ENCOUNTER: ICD-10-CM

## 2024-12-09 PROCEDURE — 73130 X-RAY EXAM OF HAND: CPT | Mod: LT

## 2024-12-09 PROCEDURE — 99213 OFFICE O/P EST LOW 20 MIN: CPT

## 2024-12-09 PROCEDURE — 73110 X-RAY EXAM OF WRIST: CPT | Mod: LT
